# Patient Record
Sex: FEMALE | Race: WHITE | NOT HISPANIC OR LATINO | Employment: FULL TIME | URBAN - METROPOLITAN AREA
[De-identification: names, ages, dates, MRNs, and addresses within clinical notes are randomized per-mention and may not be internally consistent; named-entity substitution may affect disease eponyms.]

---

## 2024-11-05 ENCOUNTER — HOSPITAL ENCOUNTER (OUTPATIENT)
Dept: RADIOLOGY | Facility: CLINIC | Age: 54
Discharge: HOME | End: 2024-11-05
Payer: COMMERCIAL

## 2024-11-05 ENCOUNTER — OFFICE VISIT (OUTPATIENT)
Dept: PRIMARY CARE | Facility: CLINIC | Age: 54
End: 2024-11-05
Payer: COMMERCIAL

## 2024-11-05 VITALS
OXYGEN SATURATION: 92 % | WEIGHT: 189 LBS | HEIGHT: 67 IN | DIASTOLIC BLOOD PRESSURE: 84 MMHG | SYSTOLIC BLOOD PRESSURE: 140 MMHG | BODY MASS INDEX: 29.66 KG/M2 | HEART RATE: 97 BPM

## 2024-11-05 DIAGNOSIS — R05.9 COUGH, UNSPECIFIED TYPE: Primary | ICD-10-CM

## 2024-11-05 DIAGNOSIS — R05.9 COUGH, UNSPECIFIED TYPE: ICD-10-CM

## 2024-11-05 PROBLEM — R00.1 SINUS BRADYCARDIA: Status: ACTIVE | Noted: 2024-11-05

## 2024-11-05 PROBLEM — N60.09 BREAST CYST: Status: ACTIVE | Noted: 2024-11-05

## 2024-11-05 PROBLEM — M79.646 FINGER PAIN: Status: ACTIVE | Noted: 2024-11-05

## 2024-11-05 PROBLEM — M77.10 LATERAL EPICONDYLITIS: Status: ACTIVE | Noted: 2024-11-05

## 2024-11-05 PROBLEM — J01.90 ACUTE SINUSITIS: Status: ACTIVE | Noted: 2024-11-05

## 2024-11-05 PROBLEM — M79.7 FIBROMYALGIA: Status: ACTIVE | Noted: 2024-11-05

## 2024-11-05 PROBLEM — R13.10 DYSPHAGIA: Status: ACTIVE | Noted: 2024-11-05

## 2024-11-05 PROBLEM — E66.3 OVERWEIGHT (BMI 25.0-29.9): Status: ACTIVE | Noted: 2024-11-05

## 2024-11-05 PROBLEM — E53.8 VITAMIN B12 DEFICIENCY: Status: ACTIVE | Noted: 2024-11-05

## 2024-11-05 PROBLEM — K59.00 CONSTIPATION: Status: ACTIVE | Noted: 2024-11-05

## 2024-11-05 PROBLEM — J02.9 SORETHROAT: Status: ACTIVE | Noted: 2024-11-05

## 2024-11-05 PROBLEM — R09.82 PND (POST-NASAL DRIP): Status: ACTIVE | Noted: 2024-11-05

## 2024-11-05 PROBLEM — E04.9 THYROID GOITER: Status: ACTIVE | Noted: 2024-11-05

## 2024-11-05 PROBLEM — R53.81 MALAISE: Status: ACTIVE | Noted: 2024-11-05

## 2024-11-05 PROBLEM — E55.9 VITAMIN D DEFICIENCY: Status: ACTIVE | Noted: 2024-11-05

## 2024-11-05 PROBLEM — R31.9 HEMATURIA: Status: ACTIVE | Noted: 2024-11-05

## 2024-11-05 PROBLEM — G47.00 INSOMNIA: Status: ACTIVE | Noted: 2024-11-05

## 2024-11-05 PROBLEM — J04.0 LARYNGITIS: Status: ACTIVE | Noted: 2024-11-05

## 2024-11-05 PROBLEM — B34.9 VIRAL SYNDROME: Status: ACTIVE | Noted: 2024-11-05

## 2024-11-05 PROBLEM — M25.50 ARTHRALGIA: Status: ACTIVE | Noted: 2024-11-05

## 2024-11-05 PROBLEM — J30.9 ALLERGIC RHINITIS: Status: ACTIVE | Noted: 2024-11-05

## 2024-11-05 PROBLEM — R53.83 FATIGUE: Status: ACTIVE | Noted: 2024-11-05

## 2024-11-05 PROBLEM — L21.9 SEBORRHEIC DERMATITIS: Status: ACTIVE | Noted: 2024-11-05

## 2024-11-05 PROBLEM — S63.615A SPRAIN OF LEFT RING FINGER: Status: ACTIVE | Noted: 2024-11-05

## 2024-11-05 PROBLEM — J06.9 ACUTE UPPER RESPIRATORY INFECTION: Status: ACTIVE | Noted: 2024-11-05

## 2024-11-05 PROBLEM — M76.60 ACHILLES TENDINITIS: Status: ACTIVE | Noted: 2024-11-05

## 2024-11-05 PROBLEM — U07.1 DISEASE DUE TO SEVERE ACUTE RESPIRATORY SYNDROME CORONAVIRUS 2 (SARS-COV-2): Status: ACTIVE | Noted: 2024-11-05

## 2024-11-05 PROBLEM — M77.00 MEDIAL EPICONDYLITIS: Status: ACTIVE | Noted: 2024-11-05

## 2024-11-05 PROBLEM — M25.559 JOINT PAIN, HIP: Status: ACTIVE | Noted: 2024-11-05

## 2024-11-05 LAB
POC BINAX EXPIRATION: NORMAL
POC BINAX NOW COVID SERIAL NUMBER: NORMAL
POC SARS-COV-2 AG BINAX: NORMAL

## 2024-11-05 PROCEDURE — 87811 SARS-COV-2 COVID19 W/OPTIC: CPT | Performed by: FAMILY MEDICINE

## 2024-11-05 PROCEDURE — 3008F BODY MASS INDEX DOCD: CPT | Performed by: FAMILY MEDICINE

## 2024-11-05 PROCEDURE — 99213 OFFICE O/P EST LOW 20 MIN: CPT | Performed by: FAMILY MEDICINE

## 2024-11-05 PROCEDURE — 71046 X-RAY EXAM CHEST 2 VIEWS: CPT | Performed by: RADIOLOGY

## 2024-11-05 PROCEDURE — 71046 X-RAY EXAM CHEST 2 VIEWS: CPT

## 2024-11-05 PROCEDURE — 1036F TOBACCO NON-USER: CPT | Performed by: FAMILY MEDICINE

## 2024-11-05 RX ORDER — PROMETHAZINE HYDROCHLORIDE AND DEXTROMETHORPHAN HYDROBROMIDE 6.25; 15 MG/5ML; MG/5ML
5 SYRUP ORAL EVERY 4 HOURS PRN
Qty: 120 ML | Refills: 0 | Status: SHIPPED | OUTPATIENT
Start: 2024-11-05 | End: 2024-11-07 | Stop reason: WASHOUT

## 2024-11-05 RX ORDER — BENZONATATE 100 MG/1
100 CAPSULE ORAL 3 TIMES DAILY PRN
Qty: 42 CAPSULE | Refills: 0 | Status: SHIPPED | OUTPATIENT
Start: 2024-11-05 | End: 2024-12-05

## 2024-11-05 RX ORDER — ACETAMINOPHEN 500 MG
500-1000 TABLET ORAL EVERY 6 HOURS PRN
COMMUNITY
Start: 2024-11-02 | End: 2024-11-09

## 2024-11-05 RX ORDER — BUDESONIDE AND FORMOTEROL FUMARATE DIHYDRATE 80; 4.5 UG/1; UG/1
2 AEROSOL RESPIRATORY (INHALATION) 2 TIMES DAILY
COMMUNITY
Start: 2022-01-05

## 2024-11-05 RX ORDER — PREDNISONE 10 MG/1
TABLET ORAL
Qty: 30 TABLET | Refills: 0 | Status: SHIPPED | OUTPATIENT
Start: 2024-11-05 | End: 2024-11-15

## 2024-11-05 RX ORDER — ERGOCALCIFEROL 1.25 MG/1
1 CAPSULE ORAL
COMMUNITY
Start: 2022-01-06

## 2024-11-05 RX ORDER — AMOXICILLIN AND CLAVULANATE POTASSIUM 875; 125 MG/1; MG/1
1 TABLET, FILM COATED ORAL 2 TIMES DAILY
COMMUNITY
Start: 2024-11-02 | End: 2024-11-09

## 2024-11-05 RX ORDER — IPRATROPIUM BROMIDE AND ALBUTEROL SULFATE 2.5; .5 MG/3ML; MG/3ML
3 SOLUTION RESPIRATORY (INHALATION) 4 TIMES DAILY
COMMUNITY
Start: 2021-12-31

## 2024-11-05 RX ORDER — CETIRIZINE HYDROCHLORIDE 10 MG/1
10 TABLET ORAL
COMMUNITY

## 2024-11-05 ASSESSMENT — ENCOUNTER SYMPTOMS
DIZZINESS: 0
SHORTNESS OF BREATH: 1
VOICE CHANGE: 1
COUGH: 1
HEADACHES: 0
SINUS PAIN: 0
FEVER: 1
ACTIVITY CHANGE: 0
SINUS PRESSURE: 0
FATIGUE: 1
SORE THROAT: 1

## 2024-11-05 ASSESSMENT — PATIENT HEALTH QUESTIONNAIRE - PHQ9
2. FEELING DOWN, DEPRESSED OR HOPELESS: NOT AT ALL
SUM OF ALL RESPONSES TO PHQ9 QUESTIONS 1 AND 2: 0
1. LITTLE INTEREST OR PLEASURE IN DOING THINGS: NOT AT ALL

## 2024-11-05 NOTE — LETTER
November 6, 2024     Patient: Carlotta Jackson   YOB: 1970   Date of Visit: 11/5/2024       To Whom It May Concern:    Carlotta Jackson was seen in my clinic on 11/5/2024 at 9:15 am. Please excuse Carlotta for her absence from work on this day to make the appointment.  Please excuse Carlotta for her absence from work on this day to make the appointment.  Please excuse her beginning 11/1/24-11/6/24 for an acute illness that prohibits her from working in any capacity.  Patient was also tested in our office for covid and the test was negative.     If you have any questions or concerns, please don't hesitate to call.         Sincerely,         Salvador Augustin MD        CC: No Recipients

## 2024-11-05 NOTE — PROGRESS NOTES
"Subjective   Patient ID: Carlotta Jackson is a 54 y.o. female who presents for Sick Visit (Cough loss of voice fever crackling in throat. Went to [facility] clinic no relief. Sick since last week).    Cough   - has had persistent and severe cough for 5 days   - reports the cough is worse at night and when laying down flat   - cough is non-productive   - has pain with deep coughing   - does endorse some fevers 3 days ago   - has been taking OTC cold medicine but has not helped with her coughing significantly   - does have a history of severe covid infection with persistent inflammation in the lungs for several weeks          Review of Systems   Constitutional:  Positive for fatigue and fever. Negative for activity change.   HENT:  Positive for sore throat and voice change. Negative for sinus pressure and sinus pain.    Respiratory:  Positive for cough and shortness of breath.    Cardiovascular:  Negative for chest pain.   Neurological:  Negative for dizziness and headaches.       Objective   /84   Pulse 97   Ht 1.702 m (5' 7\")   Wt 85.7 kg (189 lb)   SpO2 92%   BMI 29.60 kg/m²     Physical Exam  Constitutional:       Appearance: Normal appearance.   Cardiovascular:      Rate and Rhythm: Normal rate and regular rhythm.   Neurological:      Mental Status: She is alert.   Psychiatric:         Mood and Affect: Mood normal.         Behavior: Behavior normal.       Assessment/Plan   Problem List Items Addressed This Visit    None  Visit Diagnoses         Codes    Cough, unspecified type    -  Primary R05.9    stable   - tx with steroid, promethazine and tessalon   - XR ordered given persistent symptoms   - f/u PRN     Relevant Medications    predniSONE (Deltasone) 10 mg tablet    promethazine-DM (Phenergan-DM) 6.25-15 mg/5 mL syrup    benzonatate (Tessalon) 100 mg capsule    Other Relevant Orders    POCT BinaxNOW Covid-19 Ag Card manually resulted (Completed)    XR chest 2 views               "

## 2024-11-05 NOTE — LETTER
November 5, 2024     Patient: Carlotta Jackson   YOB: 1970   Date of Visit: 11/5/2024       To Whom It May Concern:    Carlotta Jackson was seen in my clinic on 11/5/2024 at 9:15 am. Please excuse Carlotta for her absence from work on this day to make the appointment.  Please excuse her beginning 11/1/24-11/5/24 for an acute illness that prohibits her from working in any capacity.      If you have any questions or concerns, please don't hesitate to call.         Sincerely,         Salvador Augustin MD        CC: No Recipients

## 2024-11-07 ENCOUNTER — OFFICE VISIT (OUTPATIENT)
Dept: PRIMARY CARE | Facility: CLINIC | Age: 54
End: 2024-11-07
Payer: COMMERCIAL

## 2024-11-07 VITALS
SYSTOLIC BLOOD PRESSURE: 140 MMHG | HEIGHT: 67 IN | OXYGEN SATURATION: 92 % | BODY MASS INDEX: 29.35 KG/M2 | DIASTOLIC BLOOD PRESSURE: 74 MMHG | HEART RATE: 105 BPM | WEIGHT: 187 LBS

## 2024-11-07 DIAGNOSIS — R05.9 COUGH, UNSPECIFIED TYPE: Primary | ICD-10-CM

## 2024-11-07 PROCEDURE — 1036F TOBACCO NON-USER: CPT | Performed by: FAMILY MEDICINE

## 2024-11-07 PROCEDURE — 3008F BODY MASS INDEX DOCD: CPT | Performed by: FAMILY MEDICINE

## 2024-11-07 PROCEDURE — 99213 OFFICE O/P EST LOW 20 MIN: CPT | Performed by: FAMILY MEDICINE

## 2024-11-07 RX ORDER — CODEINE PHOSPHATE AND GUAIFENESIN 10; 100 MG/5ML; MG/5ML
5 SOLUTION ORAL EVERY 8 HOURS PRN
Qty: 60 ML | Refills: 0 | Status: SHIPPED | OUTPATIENT
Start: 2024-11-07 | End: 2024-11-14

## 2024-11-07 RX ORDER — PROMETHAZINE HYDROCHLORIDE AND CODEINE PHOSPHATE 6.25; 1 MG/5ML; MG/5ML
5 SOLUTION ORAL EVERY 6 HOURS PRN
Qty: 80 ML | Refills: 0 | Status: SHIPPED | OUTPATIENT
Start: 2024-11-07 | End: 2024-11-07 | Stop reason: WASHOUT

## 2024-11-07 ASSESSMENT — PATIENT HEALTH QUESTIONNAIRE - PHQ9
SUM OF ALL RESPONSES TO PHQ9 QUESTIONS 1 AND 2: 0
1. LITTLE INTEREST OR PLEASURE IN DOING THINGS: NOT AT ALL
2. FEELING DOWN, DEPRESSED OR HOPELESS: NOT AT ALL

## 2024-11-07 ASSESSMENT — ENCOUNTER SYMPTOMS
SHORTNESS OF BREATH: 1
FEVER: 0
ACTIVITY CHANGE: 0
COUGH: 1
FATIGUE: 1
HEADACHES: 0
DIZZINESS: 0

## 2024-11-07 NOTE — LETTER
November 7, 2024     Patient: Carlotta Jackson   YOB: 1970   Date of Visit: 11/7/2024       To Whom It May Concern:    Carlotta Jackson was seen in my clinic on 11/7/2024 at 11:30 am. Please excuse Carlotta for her absence from work on this day to make the appointment.  Please excuse her beginning 11/1/24-11/8/24 for an acute illness.  This is preventing her from working in any capacity.     If you have any questions or concerns, please don't hesitate to call.         Sincerely,         Salvador Aguustin MD        CC: No Recipients

## 2024-11-07 NOTE — PROGRESS NOTES
"Subjective   Patient ID: Carlotta Jackson is a 54 y.o. female who presents for Sick Visit (Pain on left side chest area).    Persistent L sided chest pain   - reports pain is worse when she is coughing, but painful constantly   - reports she is having coughing symptoms daily that cannot be controlled   - Flank is very tender in that area   - not having pain with deep inspiration   - still currently on a course of augmentin but it is not managing her symptoms   - has been losing voice            Review of Systems   Constitutional:  Positive for fatigue. Negative for activity change and fever.   Respiratory:  Positive for cough and shortness of breath.    Cardiovascular:  Negative for chest pain.   Neurological:  Negative for dizziness and headaches.       Objective   /74   Pulse 105   Ht 1.702 m (5' 7\")   Wt 84.8 kg (187 lb)   SpO2 92%   BMI 29.29 kg/m²     Physical Exam  Constitutional:       Appearance: Normal appearance.   HENT:      Mouth/Throat:      Mouth: Mucous membranes are moist.      Pharynx: Oropharynx is clear.   Cardiovascular:      Rate and Rhythm: Normal rate and regular rhythm.   Pulmonary:      Effort: Pulmonary effort is normal.      Breath sounds: Normal breath sounds.   Neurological:      Mental Status: She is alert.         Assessment/Plan   Problem List Items Addressed This Visit    None  Visit Diagnoses         Codes    Cough, unspecified type    -  Primary R05.9    stable  - increase strength of cough medication   - continue antibiotics and steroid   - f/u PRN     Relevant Medications    codeine-guaifenesin (Robitussin-AC)  mg/5 mL syrup               "

## 2024-11-14 RX ORDER — CEFDINIR 300 MG/1
300 CAPSULE ORAL 2 TIMES DAILY
COMMUNITY
Start: 2024-11-13 | End: 2024-11-20

## 2024-11-14 RX ORDER — METHYLPREDNISOLONE 4 MG/1
4 TABLET ORAL
COMMUNITY
Start: 2024-11-13 | End: 2024-11-19

## 2024-11-15 ENCOUNTER — OFFICE VISIT (OUTPATIENT)
Dept: PRIMARY CARE | Facility: CLINIC | Age: 54
End: 2024-11-15
Payer: COMMERCIAL

## 2024-11-15 VITALS
SYSTOLIC BLOOD PRESSURE: 94 MMHG | OXYGEN SATURATION: 93 % | DIASTOLIC BLOOD PRESSURE: 62 MMHG | HEIGHT: 67 IN | HEART RATE: 86 BPM | WEIGHT: 186 LBS | BODY MASS INDEX: 29.19 KG/M2

## 2024-11-15 DIAGNOSIS — J18.9 PNEUMONIA OF BOTH LUNGS DUE TO INFECTIOUS ORGANISM, UNSPECIFIED PART OF LUNG: Primary | ICD-10-CM

## 2024-11-15 DIAGNOSIS — R07.81 RIB PAIN: ICD-10-CM

## 2024-11-15 PROBLEM — R20.0 NUMBNESS: Status: ACTIVE | Noted: 2024-11-15

## 2024-11-15 PROBLEM — T07.XXXA MULTIPLE BRUISES: Status: ACTIVE | Noted: 2024-11-15

## 2024-11-15 PROBLEM — Z58.6 INADEQUATE DRINKING-WATER SUPPLY: Status: ACTIVE | Noted: 2024-11-15

## 2024-11-15 PROBLEM — L72.0 MILIA: Status: ACTIVE | Noted: 2024-11-15

## 2024-11-15 PROBLEM — R05.9 COUGH: Status: ACTIVE | Noted: 2024-11-15

## 2024-11-15 PROBLEM — Z59.86 FINANCIAL INSECURITY: Status: ACTIVE | Noted: 2024-11-15

## 2024-11-15 PROBLEM — M79.609 LIMB PAIN: Status: ACTIVE | Noted: 2024-11-15

## 2024-11-15 PROBLEM — U09.9 CHRONIC POST-COVID-19 SYNDROME: Status: ACTIVE | Noted: 2024-11-15

## 2024-11-15 PROBLEM — M54.2 NECK PAIN: Status: ACTIVE | Noted: 2024-11-15

## 2024-11-15 PROBLEM — M79.10 MYALGIA: Status: ACTIVE | Noted: 2024-11-15

## 2024-11-15 PROBLEM — S63.619A SPRAIN OF FINGER: Status: ACTIVE | Noted: 2024-11-15

## 2024-11-15 PROBLEM — J96.01 ACUTE RESPIRATORY FAILURE WITH HYPOXIA (MULTI): Status: ACTIVE | Noted: 2024-11-10

## 2024-11-15 PROBLEM — Z86.69 HISTORY OF DISORDER OF GLOBE OF EYE: Status: ACTIVE | Noted: 2024-11-15

## 2024-11-15 PROBLEM — K29.00 ACUTE GASTRITIS: Status: ACTIVE | Noted: 2024-11-15

## 2024-11-15 PROBLEM — F41.0 PANIC ATTACK: Status: ACTIVE | Noted: 2024-11-15

## 2024-11-15 PROCEDURE — 1036F TOBACCO NON-USER: CPT | Performed by: FAMILY MEDICINE

## 2024-11-15 PROCEDURE — 99214 OFFICE O/P EST MOD 30 MIN: CPT | Performed by: FAMILY MEDICINE

## 2024-11-15 PROCEDURE — 3008F BODY MASS INDEX DOCD: CPT | Performed by: FAMILY MEDICINE

## 2024-11-15 RX ORDER — IPRATROPIUM BROMIDE AND ALBUTEROL SULFATE 2.5; .5 MG/3ML; MG/3ML
3 SOLUTION RESPIRATORY (INHALATION) 4 TIMES DAILY
Qty: 180 ML | Refills: 1 | Status: SHIPPED | OUTPATIENT
Start: 2024-11-15

## 2024-11-15 RX ORDER — AZITHROMYCIN 500 MG/1
500 TABLET, FILM COATED ORAL DAILY
COMMUNITY
Start: 2024-11-13

## 2024-11-15 RX ORDER — MELOXICAM 7.5 MG/1
7.5 TABLET ORAL DAILY
Qty: 30 TABLET | Refills: 11 | Status: SHIPPED | OUTPATIENT
Start: 2024-11-15 | End: 2025-11-15

## 2024-11-15 RX ORDER — ALBUTEROL SULFATE 108 UG/1
1 AEROSOL, METERED RESPIRATORY (INHALATION) EVERY 4 HOURS PRN
COMMUNITY
Start: 2024-11-13

## 2024-11-15 RX ORDER — GUAIFENESIN 600 MG/1
1200 TABLET, EXTENDED RELEASE ORAL 2 TIMES DAILY
Qty: 120 TABLET | Refills: 11 | Status: SHIPPED | OUTPATIENT
Start: 2024-11-15 | End: 2025-11-15

## 2024-11-15 ASSESSMENT — PATIENT HEALTH QUESTIONNAIRE - PHQ9
1. LITTLE INTEREST OR PLEASURE IN DOING THINGS: NOT AT ALL
2. FEELING DOWN, DEPRESSED OR HOPELESS: NOT AT ALL
SUM OF ALL RESPONSES TO PHQ9 QUESTIONS 1 AND 2: 0

## 2024-11-15 ASSESSMENT — ENCOUNTER SYMPTOMS
FEVER: 0
FATIGUE: 1
DIZZINESS: 0
ACTIVITY CHANGE: 0
SHORTNESS OF BREATH: 1
HEADACHES: 0
COUGH: 1

## 2024-11-15 NOTE — PROGRESS NOTES
"Subjective   Patient ID: Carlotta Jackson is a 54 y.o. female who presents for Hospital Follow-up ([facility] pneumonia).    Pneumonia   - admitted to [facility] for 3 days for pneumonia   - had multiple rounds of antibiotics and a repeat round of steroids in addition to cough suppressants   - discharged 2 days ago   - has had no fevers/chills since being home   - discharged on cefdinir, azithromycin and antibiotics   - has not been using inhaler regularly for helping with breathing   - having some pain in her lower lungs/abdomen          Review of Systems   Constitutional:  Positive for fatigue. Negative for activity change and fever.   Respiratory:  Positive for cough and shortness of breath.    Cardiovascular:  Negative for chest pain.   Neurological:  Negative for dizziness and headaches.       Objective   BP 94/62   Pulse 86   Ht 1.702 m (5' 7\")   Wt 84.4 kg (186 lb)   SpO2 93%   BMI 29.13 kg/m²     Physical Exam  Constitutional:       Appearance: Normal appearance.   Cardiovascular:      Rate and Rhythm: Normal rate and regular rhythm.   Pulmonary:      Effort: Pulmonary effort is normal.      Breath sounds: Rhonchi and rales present.   Neurological:      Mental Status: She is alert.   Psychiatric:         Mood and Affect: Mood normal.         Behavior: Behavior normal.         Assessment/Plan   Problem List Items Addressed This Visit             ICD-10-CM    Bilateral pneumonia - Primary J18.9     Stable  - f/u from hospitalization for bilateral pneumonia   - clinically stable, O2 stats stable on exam   - continue cefdinir and azithromycin   - continue oral steroid   - add on mucinex and nebulizer solution   - f/u PRN if not improving          Relevant Medications    guaiFENesin (Mucinex) 600 mg 12 hr tablet    ipratropium-albuteroL (Duo-Neb) 0.5-2.5 mg/3 mL nebulizer solution     Other Visit Diagnoses         Codes    Rib pain     R07.81    stable  - likely 2/2 coughing/pneumonia   - add on meloxicam   - f/u " PRN     Relevant Medications    meloxicam (Mobic) 7.5 mg tablet

## 2024-11-15 NOTE — ASSESSMENT & PLAN NOTE
Stable  - f/u from hospitalization for bilateral pneumonia   - clinically stable, O2 stats stable on exam   - continue cefdinir and azithromycin   - continue oral steroid   - add on mucinex and nebulizer solution   - f/u PRN if not improving

## 2024-11-18 ENCOUNTER — PATIENT OUTREACH (OUTPATIENT)
Dept: PRIMARY CARE | Facility: CLINIC | Age: 54
End: 2024-11-18
Payer: COMMERCIAL

## 2024-11-18 NOTE — PROGRESS NOTES
Discharge Facility:  [facility]   Discharge Diagnosis:    Acute respiratory failure with hypoxia, Bilateral pneumonia  Admission Date:   11/10/24   Discharge Date:   11/13/24     PCP Appointment Date:  11/15/24  Specialist Appointment Date:   Hospital Encounter and Summary Linked: No    TCM outreach deferred as this patient has a follow up scheduled with PCP within 2 business days of DC on 11/15/24

## 2024-11-19 ENCOUNTER — PATIENT OUTREACH (OUTPATIENT)
Dept: PRIMARY CARE | Facility: CLINIC | Age: 54
End: 2024-11-19
Payer: COMMERCIAL

## 2024-11-19 NOTE — PROGRESS NOTES
Call regarding appt. with PCP on 11/15/24 after hospitalization.  At time of outreach call the patient feels as if their condition has (improved/ since last visit.  Pt declines any needs at this time. Contact info provided to pt.

## 2024-11-20 ENCOUNTER — OFFICE VISIT (OUTPATIENT)
Dept: PRIMARY CARE | Facility: CLINIC | Age: 54
End: 2024-11-20
Payer: COMMERCIAL

## 2024-11-20 ENCOUNTER — HOSPITAL ENCOUNTER (OUTPATIENT)
Dept: RADIOLOGY | Facility: CLINIC | Age: 54
Discharge: HOME | End: 2024-11-20
Payer: COMMERCIAL

## 2024-11-20 VITALS
WEIGHT: 188 LBS | HEART RATE: 85 BPM | DIASTOLIC BLOOD PRESSURE: 74 MMHG | OXYGEN SATURATION: 96 % | SYSTOLIC BLOOD PRESSURE: 110 MMHG | HEIGHT: 67 IN | BODY MASS INDEX: 29.51 KG/M2

## 2024-11-20 DIAGNOSIS — R10.11 RIGHT UPPER QUADRANT ABDOMINAL PAIN: Primary | ICD-10-CM

## 2024-11-20 DIAGNOSIS — R11.0 NAUSEA: ICD-10-CM

## 2024-11-20 DIAGNOSIS — R14.0 BLOATING: ICD-10-CM

## 2024-11-20 DIAGNOSIS — R10.11 RIGHT UPPER QUADRANT ABDOMINAL PAIN: ICD-10-CM

## 2024-11-20 PROCEDURE — 3008F BODY MASS INDEX DOCD: CPT | Performed by: FAMILY MEDICINE

## 2024-11-20 PROCEDURE — 74176 CT ABD & PELVIS W/O CONTRAST: CPT

## 2024-11-20 PROCEDURE — 99214 OFFICE O/P EST MOD 30 MIN: CPT | Performed by: FAMILY MEDICINE

## 2024-11-20 PROCEDURE — 1036F TOBACCO NON-USER: CPT | Performed by: FAMILY MEDICINE

## 2024-11-20 RX ORDER — TRAMADOL HYDROCHLORIDE 50 MG/1
50 TABLET ORAL EVERY 6 HOURS PRN
Qty: 10 TABLET | Refills: 0 | Status: SHIPPED | OUTPATIENT
Start: 2024-11-20 | End: 2024-11-23

## 2024-11-20 ASSESSMENT — PATIENT HEALTH QUESTIONNAIRE - PHQ9
1. LITTLE INTEREST OR PLEASURE IN DOING THINGS: NOT AT ALL
SUM OF ALL RESPONSES TO PHQ9 QUESTIONS 1 AND 2: 0
2. FEELING DOWN, DEPRESSED OR HOPELESS: NOT AT ALL

## 2024-11-20 ASSESSMENT — ENCOUNTER SYMPTOMS
ABDOMINAL DISTENTION: 1
FATIGUE: 1
NAUSEA: 1
ABDOMINAL PAIN: 1

## 2024-11-20 NOTE — PROGRESS NOTES
"Subjective   Patient ID: Carlotta Jackson is a 54 y.o. female who presents for Sick Visit (Stomach pain upper right side hurts worse than left).    Abdominal pain   - reports she is having issues with persistent abdominal pain   - pain is not related to eating   - has a lot of pressure, and bloating in her stomach   - has trouble laying down  - pain is worse when rotating across the abdomen   - has tried taking the anti-inflammatory which has not made a big impact on the symptoms   - has been having intermittent bowel movements with some diarrhea and some normal bowel movements   - no issues with constipation   - does endorse nausea but no vomiting   - has lost a few pounds since she initially got ill   - no history of diverticulitis or colitis          Review of Systems   Constitutional:  Positive for fatigue.   Gastrointestinal:  Positive for abdominal distention, abdominal pain and nausea.       Objective   /74   Pulse 85   Ht 1.702 m (5' 7\")   Wt 85.3 kg (188 lb)   SpO2 96%   BMI 29.44 kg/m²     Physical Exam  Constitutional:       Appearance: Normal appearance.   Cardiovascular:      Rate and Rhythm: Normal rate and regular rhythm.   Pulmonary:      Effort: Pulmonary effort is normal.      Breath sounds: Normal breath sounds.   Abdominal:      General: There is distension.      Tenderness: There is abdominal tenderness.   Neurological:      Mental Status: She is alert.         Assessment/Plan   Problem List Items Addressed This Visit    None  Visit Diagnoses         Codes    Right upper quadrant abdominal pain    -  Primary R10.11    unstable   - concern for pancreatitis, cholecystitis   - check acute imaging   - check labratory markers   - f/u after testing     Relevant Medications    traMADol (Ultram) 50 mg tablet    Other Relevant Orders    CT abdomen pelvis wo IV contrast    Comprehensive metabolic panel    Lipase    Amylase    CBC and Auto Differential    Bloating     R14.0    unstable  - " medication for pain   - discussed simple diet   - f/u after imaging     Relevant Medications    traMADol (Ultram) 50 mg tablet    Nausea     R11.0    Relevant Medications    traMADol (Ultram) 50 mg tablet

## 2024-11-21 NOTE — RESULT ENCOUNTER NOTE
CT abdomen does not show any indication for the pain you are dealing with.  If symptoms are persisting I can refer you to a GI specialist for evaluation for bloating/distension

## 2024-12-19 ENCOUNTER — PATIENT OUTREACH (OUTPATIENT)
Dept: PRIMARY CARE | Facility: CLINIC | Age: 54
End: 2024-12-19
Payer: COMMERCIAL

## 2025-01-17 ENCOUNTER — PATIENT OUTREACH (OUTPATIENT)
Dept: PRIMARY CARE | Facility: CLINIC | Age: 55
End: 2025-01-17
Payer: COMMERCIAL